# Patient Record
Sex: MALE | Race: WHITE | Employment: OTHER | URBAN - METROPOLITAN AREA
[De-identification: names, ages, dates, MRNs, and addresses within clinical notes are randomized per-mention and may not be internally consistent; named-entity substitution may affect disease eponyms.]

---

## 2022-06-07 ENCOUNTER — APPOINTMENT (OUTPATIENT)
Dept: GENERAL RADIOLOGY | Age: 87
End: 2022-06-07
Payer: MEDICARE

## 2022-06-07 ENCOUNTER — HOSPITAL ENCOUNTER (EMERGENCY)
Age: 87
Discharge: HOME | End: 2022-06-07
Attending: EMERGENCY MEDICINE
Payer: MEDICARE

## 2022-06-07 VITALS
HEART RATE: 54 BPM | RESPIRATION RATE: 18 BRPM | OXYGEN SATURATION: 99 % | DIASTOLIC BLOOD PRESSURE: 61 MMHG | SYSTOLIC BLOOD PRESSURE: 148 MMHG | TEMPERATURE: 98.3 F

## 2022-06-07 DIAGNOSIS — S51.812A LACERATION OF LEFT FOREARM, INITIAL ENCOUNTER: Primary | ICD-10-CM

## 2022-06-07 PROCEDURE — 12002 RPR S/N/AX/GEN/TRNK2.6-7.5CM: CPT

## 2022-06-07 PROCEDURE — 73090 X-RAY EXAM OF FOREARM: CPT

## 2022-06-07 PROCEDURE — 99283 EMERGENCY DEPT VISIT LOW MDM: CPT

## 2022-06-07 PROCEDURE — 2500000003 HC RX 250 WO HCPCS: Performed by: PHYSICIAN ASSISTANT

## 2022-06-07 RX ORDER — LIDOCAINE HYDROCHLORIDE AND EPINEPHRINE 10; 10 MG/ML; UG/ML
20 INJECTION, SOLUTION INFILTRATION; PERINEURAL ONCE
Status: COMPLETED | OUTPATIENT
Start: 2022-06-07 | End: 2022-06-07

## 2022-06-07 RX ORDER — BACITRACIN ZINC AND POLYMYXIN B SULFATE 500; 1000 [USP'U]/G; [USP'U]/G
OINTMENT TOPICAL ONCE
Status: DISCONTINUED | OUTPATIENT
Start: 2022-06-07 | End: 2022-06-07 | Stop reason: HOSPADM

## 2022-06-07 RX ADMIN — LIDOCAINE HYDROCHLORIDE,EPINEPHRINE BITARTRATE 20 ML: 10; .01 INJECTION, SOLUTION INFILTRATION; PERINEURAL at 19:55

## 2022-06-07 ASSESSMENT — PAIN - FUNCTIONAL ASSESSMENT: PAIN_FUNCTIONAL_ASSESSMENT: NONE - DENIES PAIN

## 2022-06-07 ASSESSMENT — ENCOUNTER SYMPTOMS: COLOR CHANGE: 0

## 2022-06-07 NOTE — ED PROVIDER NOTES
[address] ENCOUNTER          PHYSICIAN ASSISTANT NOTE       Date of evaluation: 6/7/2022    Chief Complaint     Laceration (from nursing home; hx dementia; laceration to left forearm after leaving on a glass case and breaking it)      History of Present Illness     Tierra Rutherford is a 80 y.o. male who presents with complaint of laceration to his left forearm. He was leaning on a glass table when it broke and he sustained laceration to his left forearm. He states this is the second time that this has happened to him. He denies any pain. Denies weakness or numbness. He comes from a nursing home and has a baseline of dementia. His tetanus was up-to-date in 2021. Review of Systems     Review of Systems   Musculoskeletal: Negative for arthralgias and myalgias. Skin: Positive for wound. Negative for color change. Neurological: Negative for weakness and numbness. Hematological: Does not bruise/bleed easily. All other systems reviewed and are negative. Past Medical, Surgical, Family, and Social History     He has a past medical history of Cancer ([facility] [address] DU (duodenal ulcer), and Osteoarthritis. He has a past surgical history that includes Hemorrhoid surgery (N/A, 1975); Toe amputation (Right, 1999); Lithotripsy (Right, 1/2013); and Colonoscopy (2004). His family history includes Heart Disease in his father and mother; High Blood Pressure in his father. He reports that he has quit smoking. He has quit using smokeless tobacco. He reports current alcohol use. He reports that he does not use drugs. Medications     Previous Medications    FISH OIL-CHOLECALCIFEROL (FISH OIL + D3 PO)    Take 750 mg by mouth daily. Allergies     He has No Known Allergies. Physical Exam     INITIAL VITALS: BP: (!) 148/61, Temp: 98.3 °F (36.8 °C), Heart Rate: 54, Resp: 18, SpO2: 99 %  Physical Exam  Vitals and nursing note reviewed.    Musculoskeletal:      Left forearm: Laceration (2 skin tear laceration on the forearm with subQ fat exposed) present. No swelling, edema, deformity, tenderness or bony tenderness. Skin:     General: Skin is warm and dry. Neurological:      General: No focal deficit present. Mental Status: He is alert. Psychiatric:         Behavior: Behavior normal.               Diagnostic Results       RADIOLOGY:  XR RADIUS ULNA LEFT (2 VIEWS)   Final Result   Impression:   No acute osseous abnormality. Soft tissue laceration. No radiopaque foreign body. LABS:   No results found for this visit on 06/07/22. ED BEDSIDE ULTRASOUND:      RECENT VITALS:  BP: (!) 148/61, Temp: 98.3 °F (36.8 °C), Heart Rate: 54, Resp: 18, SpO2: 99 %     Procedures     Laceration Repair Procedure Note    Indication: Laceration    Procedure:  After verbal informed consent was obtained, the patient was placed in the appropriate position and anesthesia around the laceration was obtained by infiltration using 1% Lidocaine with epinephrine. The area was then irrigated with high pressure normal saline. The laceration was closed with 5-0 Ethilon using interrupted sutures. A second laceration was closed with 5-0 Ethilon using interrupted sutures. The wound area was then dressed with polysporin and dressing. Total repaired wound length: 4 cm. Other Items: Suture count: 5 and 2    The patient tolerated the procedure well. Complications: None      ED Course     Nursing Notes, Past Medical Hx,Past Surgical Hx, Social Hx, Allergies, and Family Hx were reviewed. The patient was given the following medications:  Orders Placed This Encounter   Medications    lidocaine-EPINEPHrine 1 %-1:[id number] injection 20 mL    bacitracin-polymyxin b (POLYSPORIN) ointment       CONSULTS:  None    MEDICAL DECISION MAKING / ASSESSMENT / Felipedanny Chu is a 80 y.o. male who presented to the emergency department after an injury that he fell through a glass table.   There was no foreign body seen. He is neurovascularly intact. He sustained to lacerations/skin tears on the left volar forearm. His tetanus is up-to-date. An x-ray of the area was obtained and was negative for fracture or radiopaque foreign body. Patient had laceration repair that he tolerated well. Please see procedure note above for details. The larger laceration and the smaller laceration that had exposure of the subcutaneous tissue were both sutured and there was a small area that was superficial and just a skin tear. At this time patient is stable for discharge and was given instruction on laceration care. He lives in a nursing home so they can monitor the wound for signs of infection and determine exactly when is the best time for suture removal as he may have some delayed healing. If he has new or worsening symptoms he should return the emergency room. He is stable for discharge. This patient was also evaluated by the attending physician. All care plans were discussed and agreed upon. Clinical Impression     1.  Laceration of left forearm, initial encounter        Disposition     PATIENT REFERRED [place]  [address] [address]  [phone]            DISCHARGE MEDICATIONS:  New Prescriptions    No medications on file       DISPOSITION Decision To Discharge 06/07/2022 07:34:53 PM        [place], [place]  06/07/22 1952

## 2022-07-22 ENCOUNTER — HOSPITAL ENCOUNTER (OUTPATIENT)
Dept: VASCULAR LAB | Age: 87
Discharge: HOME | End: 2022-07-22
Payer: MEDICARE

## 2022-07-22 DIAGNOSIS — R60.0 LOCALIZED EDEMA: ICD-10-CM

## 2022-07-22 PROCEDURE — 93970 EXTREMITY STUDY: CPT

## 2022-08-20 ENCOUNTER — APPOINTMENT (OUTPATIENT)
Dept: CT IMAGING | Age: 87
End: 2022-08-20
Payer: MEDICARE

## 2022-08-20 ENCOUNTER — HOSPITAL ENCOUNTER (EMERGENCY)
Age: 87
Discharge: HOME | End: 2022-08-20
Attending: EMERGENCY MEDICINE
Payer: MEDICARE

## 2022-08-20 ENCOUNTER — APPOINTMENT (OUTPATIENT)
Dept: GENERAL RADIOLOGY | Age: 87
End: 2022-08-20
Payer: MEDICARE

## 2022-08-20 VITALS
RESPIRATION RATE: 16 BRPM | TEMPERATURE: 97.9 F | OXYGEN SATURATION: 96 % | HEART RATE: 66 BPM | SYSTOLIC BLOOD PRESSURE: 110 MMHG | DIASTOLIC BLOOD PRESSURE: 78 MMHG
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
## 2022-08-20 DIAGNOSIS — S09.90XA INJURY OF HEAD, INITIAL ENCOUNTER: Primary | ICD-10-CM

## 2022-08-20 DIAGNOSIS — S51.011A SKIN TEAR OF RIGHT ELBOW WITHOUT COMPLICATION, INITIAL ENCOUNTER: ICD-10-CM

## 2022-08-20 PROCEDURE — 99284 EMERGENCY DEPT VISIT MOD MDM: CPT

## 2022-08-20 PROCEDURE — 73560 X-RAY EXAM OF KNEE 1 OR 2: CPT

## 2022-08-20 PROCEDURE — 72125 CT NECK SPINE W/O DYE: CPT

## 2022-08-20 PROCEDURE — 73562 X-RAY EXAM OF KNEE 3: CPT

## 2022-08-20 PROCEDURE — 73080 X-RAY EXAM OF ELBOW: CPT

## 2022-08-20 PROCEDURE — 70450 CT HEAD/BRAIN W/O DYE: CPT

## 2022-08-20 RX ORDER — HYDROCHLOROTHIAZIDE 25 MG/1
TABLET ORAL
COMMUNITY
Start: 2022-08-15

## 2022-08-20 RX ORDER — TAMSULOSIN HYDROCHLORIDE 0.4 MG/1
CAPSULE ORAL
COMMUNITY
Start: 2022-06-04

## 2022-08-20 RX ORDER — DONEPEZIL HYDROCHLORIDE 10 MG/1
TABLET, FILM COATED ORAL
COMMUNITY
Start: 2022-07-10

## 2022-08-20 ASSESSMENT — ENCOUNTER SYMPTOMS
SHORTNESS OF BREATH: 0
DIARRHEA: 0
VOMITING: 0
NAUSEA: 0
COUGH: 0
ABDOMINAL PAIN: 0
EYE PAIN: 0
WHEEZING: 0

## 2022-08-20 NOTE — ED PROVIDER NOTES
[address] [facility]          ATTENDING PHYSICIAN NOTE       Date of evaluation: 8/20/2022    Chief Complaint     Fall (Pt family sts that they were called by pts SNF that pt tripped walking back from dinner and fell face first onto carpet. Denies LOC. Small hematoma noted to forehead. Skin tear noted to R elbow and pt c/o bilateral knee pain. Able to ambulate to triage wit family assistance )      History of Present Illness     Melissa Wood is a 80 y.o. male who presents with chief complaint of fall. At this was a witnessed fall by the aide in his nursing facility. He was walking on some carpet when he appeared to trip and fall down striking his bilateral knees and his forehead. No loss of consciousness. Was able to get up and walk around on his own. Complains of pain in his bilateral knees. Has a skin tear to the right forearm just distal to the elbow. Tetanus is up-to-date. No headache. No nausea or vomiting. No neck pain or stiffness. No numbness or weakness. No chest pain. No abdominal pain. No anticoagulant use. Review of Systems     Review of Systems   Constitutional:  Negative for chills and fever. HENT:  Negative for congestion. Eyes:  Negative for pain. Respiratory:  Negative for cough, shortness of breath and wheezing. Cardiovascular:  Negative for chest pain and leg swelling. Gastrointestinal:  Negative for abdominal pain, diarrhea, nausea and vomiting. Genitourinary:  Negative for dysuria. Musculoskeletal:  Positive for arthralgias. Skin:  Positive for wound. Negative for rash. Neurological:  Negative for weakness and headaches. All other systems reviewed and are negative. 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  2nd ETOH     Osteoarthritis          Procedure Laterality Date    COLONOSCOPY 2004    normal     HEMORRHOID SURGERY N/A 1975    LITHOTRIPSY Right 1/2013    UC    TOE AMPUTATION Right 1999    melenoma     His family history includes Heart Disease in his father and mother; High Blood Pressure in his father. He reports that he has quit smoking. He has quit using smokeless tobacco. He reports current alcohol use. He reports that he does not use drugs. Medications     Previous Medications    DONEPEZIL (ARICEPT) 10 MG TABLET        FISH OIL-CHOLECALCIFEROL (FISH OIL + D3 PO)    Take 750 mg by mouth daily. HYDROCHLOROTHIAZIDE (HYDRODIURIL) 25 MG TABLET        TAMSULOSIN (FLOMAX) 0.4 MG CAPSULE           Allergies     He has No Known Allergies. Physical Exam     ED Triage Vitals [08/20/22 1930]   Enc Vitals Group      /78      Heart Rate 66      Resp 16      Temp 97.9 °F (36.6 °C)      Temp Source Oral      SpO2 96 %      Weight       Height       Head Circumference       Peak Flow       Pain Score       Pain Loc       Pain Edu? Excl. in [address]? General:  Non-toxic, no acute distress, fully cooperative with my exam    HEENT:  NCAT, there is a small right frontal cephalhematoma, PERRL    Neck:  Supple, no midline cervical spinal tenderness, normal ROM    Pulmonary:   No increased work of breathing; CTAB    Cardiac: Regular rate and rhythm, no murmur, thrill or gallop. Capillary refill <3s.  2+ distal pulses    Abdomen:  Soft, nontender, nondistended; no focal rebound or guarding    Musculoskeletal: There is very slight tenderness palpation about deformity of the patella bilaterally with normal range of motion of the bilateral knees and no laxity, otherwise no focal bony tenderness or abnormalities on exam    Neuro: Awake alert and oriented x4    Skin: There is an approximately 8 cm very superficial skin tear over the right proximal forearm just below the elbow which is not well approximated with minimal tension but is hemostatic      Diagnostic Results         RADIOLOGY:  CT CSpine W/O Contrast   Final Result      Multilevel spondylosis and facet arthropathy with no acute fracture. CT HEAD WO CONTRAST   Final Result      No acute intracranial hemorrhage or mass effect. Mild atrophy and chronic small vessel ischemic change. Questionable minimal right frontal scalp swelling without underlying calvarial fracture. XR ELBOW RIGHT (MIN 3 VIEWS)   Final Result      Right elbow:   Questionable joint effusion without visualized underlying acute fracture. Consider follow-up radiographs in 5 days. Right knee: Moderate osteoarthrosis without acute osseous abnormality. Left knee: Moderate osteoarthrosis without acute osseous abnormality. Anterior soft tissue swelling along the patellar tendon. XR KNEE LEFT (1-2 VIEWS)   Final Result      Right elbow:   Questionable joint effusion without visualized underlying acute fracture. Consider follow-up radiographs in 5 days. Right knee: Moderate osteoarthrosis without acute osseous abnormality. Left knee: Moderate osteoarthrosis without acute osseous abnormality. Anterior soft tissue swelling along the patellar tendon. XR KNEE RIGHT (1-2 VIEWS)   Final Result      Right elbow:   Questionable joint effusion without visualized underlying acute fracture. Consider follow-up radiographs in 5 days. Right knee: Moderate osteoarthrosis without acute osseous abnormality. Left knee: Moderate osteoarthrosis without acute osseous abnormality. Anterior soft tissue swelling along the patellar tendon. LABS:   No results found for this visit on 08/20/22. RECENT VITALS:  BP: 110/78, Temp: 97.9 °F (36.6 °C), Heart Rate: 66, Resp: 16, SpO2: 96 %     Procedures         ED Course     Nursing Notes, Past Medical Hx, Past Surgical Hx, Social Hx, Allergies, and Family Hx were reviewed.     The patient was given the following medications:  No orders of the defined types were placed in this encounter. CONSULTS:  None    MEDICAL DECISION MAKING / ASSESSMENT / PLAN     Deidra Oreilly is a 80 y.o. male who presents after mechanical trip and fall. Does have a skin tear of the right forearm. Does appear to have a small cephalhematoma so struck his head. No loss of consciousness. GCS 15. Noncontrasted head CT without evidence of intracranial traumatic injury. Cervical spine degenerative changes B is no neck pain and no focal deficits and therefore very low suspicion for cervical spinal injury. Plain films of the bilateral knees showed some soft tissue swelling anterior portion of the left knee but he is able to bear weight and has an intact extensor mechanism. Seems more likely to be contusion at this point. The skin tear did not approximate very well with Steri-Strips and I do not think would be amenable to repair with sutures and therefore a Adaptic dressing was placed over the wound and will be allowed to heal by secondary intention. Family instructed on wound care. They believe his tetanus is up-to-date. He is at his mental status baseline. Return precautions were discussed and patient discharged home in good condition. Clinical Impression     1. Injury of head, initial encounter    2. Fall, initial encounter    3.  Skin tear of right elbow without complication, initial encounter        Disposition     PATIENT REFERRED TO:  Adwoa Rodriguez III, MD  [address] [place] [address]          DISCHARGE MEDICATIONS:  New Prescriptions    No medications on file       DISPOSITION Decision To Discharge 08/20/2022 09:43:25 PM         Jose Murguia MD  08/21/22 0984

## 2022-08-21 NOTE — ED NOTES
Patient prepared for and ready to be discharged. Patient discharged at this time in no acute distress after verbalizing understanding of discharge instructions. Patient left after receiving After Visit Summary instructions.       Jonathan Canas RN  08/20/22 [id number]

## 2022-08-21 NOTE — DISCHARGE INSTRUCTIONS
We have placed a dressing over your right elbow skin tear and you should have this changed daily for at least the next couple of weeks. Follow-up with your primary care provider this week. Consider having him use a walker to help him with ambulation and prevent falls. Return to the emergency department for new or worsening symptoms including headaches, confusion, vomiting, difficulty ambulating, or any other changes.

## 2022-10-28 ENCOUNTER — APPOINTMENT (OUTPATIENT)
Dept: GENERAL RADIOLOGY | Age: 87
End: 2022-10-28
Payer: MEDICARE

## 2022-10-28 ENCOUNTER — APPOINTMENT (OUTPATIENT)
Dept: CT IMAGING | Age: 87
End: 2022-10-28
Payer: MEDICARE

## 2022-10-28 ENCOUNTER — HOSPITAL ENCOUNTER (EMERGENCY)
Age: 87
Discharge: HOME | End: 2022-10-28
Attending: EMERGENCY MEDICINE
Payer: MEDICARE

## 2022-10-28 VITALS
TEMPERATURE: 97.8 F | RESPIRATION RATE: 15 BRPM | DIASTOLIC BLOOD PRESSURE: 78 MMHG | HEART RATE: 80 BPM | OXYGEN SATURATION: 98 % | SYSTOLIC BLOOD PRESSURE: 141 MMHG

## 2022-10-28 DIAGNOSIS — S01.81XA FACIAL LACERATION, INITIAL ENCOUNTER: ICD-10-CM

## 2022-10-28 DIAGNOSIS — R29.6 UNWITNESSED FALL: Primary | ICD-10-CM

## 2022-10-28 PROCEDURE — 72125 CT NECK SPINE W/O DYE: CPT

## 2022-10-28 PROCEDURE — 12011 RPR F/E/E/N/L/M 2.5 CM/<: CPT

## 2022-10-28 PROCEDURE — 71045 X-RAY EXAM CHEST 1 VIEW: CPT

## 2022-10-28 PROCEDURE — 99284 EMERGENCY DEPT VISIT MOD MDM: CPT

## 2022-10-28 PROCEDURE — 70450 CT HEAD/BRAIN W/O DYE: CPT

## 2022-10-28 RX ORDER — HALOPERIDOL 5 MG/ML
2.5 INJECTION INTRAMUSCULAR
Status: DISCONTINUED | OUTPATIENT
Start: 2022-10-28 | End: 2022-10-28 | Stop reason: HOSPADM

## 2022-10-28 ASSESSMENT — PAIN - FUNCTIONAL ASSESSMENT
PAIN_FUNCTIONAL_ASSESSMENT: NONE - DENIES PAIN
PAIN_FUNCTIONAL_ASSESSMENT: NONE - DENIES PAIN

## 2022-10-28 NOTE — ED PROVIDER NOTES
[address] [facility] [address] ENCOUNTER          ATTENDING PHYSICIAN NOTE       Date of evaluation: 10/28/2022    ADDENDUM:      Care of this patient was assumed from Dr. Janki Abrams. The patient was seen for Fall and Laceration  . The patient's initial evaluation and plan have been discussed with the prior provider who initially evaluated the patient. Nursing Notes, Past Medical Hx, Past Surgical Hx, Social Hx, Allergies, and Family Hx were all reviewed. Diagnostic Results     EKG   N/a    RADIOLOGY:  CT CSpine W/O Contrast   Final Result      1. No acute fracture cervical spine. 2.  Multilevel cervical spondylosis without interval change including grade 1 anterolisthesis of C2 on C3 and C3 on C4. CT Head W/O Contrast   Final Result      1. No evidence of acute intracranial hemorrhage or mass effect. 2.  Mild chronic small vessel ischemic disease and atrophy. 3.  Indeterminate metallic density in the right external auditory canal.   4.  Right mastoid effusion. 5.  Left frontal scalp contusion. XR CHEST PORTABLE   Final Result      1. Mild nonspecific bilateral perihilar opacities may reflect edema, atelectasis, or infection. LABS:   No results found for this visit on 10/28/22. RECENT VITALS:  BP: (!) 146/72, Temp: 97.8 °F (36.6 °C), Heart Rate: 84, Resp: 20, SpO2: 100 %     Procedures     Lac Repair    Date/Time: 10/28/2022 7:30 AM  Performed by: Aydee Tatum MD  Authorized by:  Mich Martinez MD     Consent:     Consent obtained:  Verbal    Alternatives discussed:  No treatment  Universal protocol:     Patient identity confirmed:  Verbally with patient  Anesthesia:     Anesthesia method:  None  Laceration details:     Location:  Face    Face location:  Forehead    Length (cm):  1  Treatment:     Area cleansed with:  Soap and water  Skin repair:     Repair method:  Tissue adhesive  Repair type:     Repair type:  Simple  Post-procedure details: Dressing:  Open (no dressing)    Procedure completion:  Tolerated      ED Course          The patient was given the following medications:  Orders Placed This Encounter   Medications    haloperidol lactate (HALDOL) injection 2.5 mg       CONSULTS:  None    MEDICAL DECISION MAKING / ASSESSMENT / PLAN     Sergo Mitchell is a 80 y.o. male with laceration to forehead above left eyebrow. CT head and C-spine were negative for traumatic injury. Chest x-ray unremarkable. Laceration pretty superficial, applied some Dermabond. Wound care instructions provided. Return precautions given. Clinical Impression     1. Unwitnessed fall    2. Facial laceration, initial encounter        Disposition     PATIENT REFERRED TO:  No follow-up provider specified.     DISCHARGE MEDICATIONS:  New Prescriptions    No medications on file       P.O. Keesha So MD  10/28/22 9677

## 2022-10-28 NOTE — ED PROVIDER NOTES
[address] ENCOUNTER          ATTENDING PHYSICIAN NOTE       Date of evaluation: 10/28/2022    Chief Complaint     Fall and Laceration      History of Present Illness     Khadra Mtz is a 80 y.o. male with history of dementia presenting to the emergency department today for an unwitnessed fall and facial wounds. Patient resides in a memory care facility ([facility]) and was found in his bathroom this morning trying to wash blood off of his face. He is unable to provide any history and is overall very upset that he is in the hospital.  He perseverates on things being stolen from his room. Additional history is gathered from EMS as well as a phone call with the nurse at his facility. They state he has been very upset lately about people entering his room but has otherwise been in his usual state of health. No one witnessed the presumed fall. He is not on blood thinners per their medication list.    Review of Systems     Unable to obtain given dementia and uncooperative patient    Physical Exam     INITIAL VITALS: BP: (!) 146/72, Temp: 97.8 °F (36.6 °C), Heart Rate: 84, Resp: 20, SpO2: 100 %     Nursing note and vitals reviewed. General:  Adult male, alert and appropriately interactive. Confused and mildly agitated, nonviolent. In no distress. HENT: Normocephalic. Small skin tears/superficial lacerations to the forehead, hemostatic. External ears normal. Nose appears normal externally. Eyes: Conjunctivae normal. No scleral icterus. PERRL. Neck: Neck supple. No tracheal deviation present. CV: Normal rate. Regular rhythm. S1/S2 auscultated. No murmurs, gallops or rubs. 3+ bilateral lower extremity pitting edema. Pulm: Effort normal. Breath sounds clear to auscultation bilaterally. No wheezes. No rales or rhonchi. GI: Soft. No distension. No tenderness. No rebound or guarding. No masses. No peritoneal signs.     Musculoskeletal: No discernible tenderness of the C, T, or L-spine. Pelvis is stable and without tenderness with logroll of the bilateral legs. No discernible bony tenderness of the extremities. Scattered ecchymoses, no wounds. Neurological: Alert, interactive with examiner, not cooperative, oriented x0. Face symmetric, speech without dysarthria or obvious aphasia. Moving all extremities spontaneously. Skin: Warm, dry. No rash. No diaphoresis or erythema. Procedures   Procedures    MEDICAL DECISION MAKING     MDM: Clare Patricia is a 80 y.o. male with history as above presenting to the emergency department today for a fall from his [facility] care facility. On arrival, he is mildly agitated but nonviolent. He has abrasions and superficial lacerations to his face without signs of maxillofacial or skull fracture. No other significant evidence of trauma on his exam.  He underwent CT of the head, C-spine, as well as a chest x-ray. His tetanus immunization is up-to-date per the chart from his previous visit here in August, also for a fall. Family has arrived and confirmed that is immunization up-to-date and patient appears to be interacting and communicating at his baseline. Imaging and wound care pending at time of signout. Patient signed out the oncoming physician, Dr. Georgi Boast, who will follow-up on the results and determine the patient's ultimate disposition. Clinical Impression     1. Unwitnessed fall    2. Facial laceration, initial encounter        Disposition     DISPOSITION          Joana Ludwig MD  6:51 AM                     Past Medical, Surgical, Family, and Social History     He has a past medical history of Cancer ([facility] [address] DU (duodenal ulcer), and Osteoarthritis. He has a past surgical history that includes Hemorrhoid surgery (N/A, 1975); Toe amputation (Right, 1999); Lithotripsy (Right, 1/2013); and Colonoscopy (2004).   His family history includes Heart Disease in his father and mother; High Blood Pressure in his father. He reports that he has quit smoking. He has quit using smokeless tobacco. He reports current alcohol use. He reports that he does not use drugs. Medications     Previous Medications    DONEPEZIL (ARICEPT) 10 MG TABLET        FISH OIL-CHOLECALCIFEROL (FISH OIL + D3 PO)    Take 750 mg by mouth daily. HYDROCHLOROTHIAZIDE (HYDRODIURIL) 25 MG TABLET        TAMSULOSIN (FLOMAX) 0.4 MG CAPSULE           Allergies     He has No Known Allergies. ED Course     Nursing Notes, Past Medical Hx, Past Surgical Hx, Social Hx,Allergies, and Family Hx were reviewed. Patient was given the following medications:  Orders Placed This Encounter   Medications    haloperidol lactate (HALDOL) injection 2.5 mg       Diagnostic Results         RECENT VITALS:  BP: (!) 146/72,Temp: 97.8 °F (36.6 °C), Heart Rate: 84, Resp: 20, SpO2: 100 %     RADIOLOGY:  CT CSpine W/O Contrast    (Results Pending)   CT Head W/O Contrast    (Results Pending)   XR CHEST PORTABLE    (Results Pending)       LABS:   No results found for this visit on 10/28/22. CONSULTS:  None    PATIENT REFERRED TO:  No follow-up provider specified.     DISCHARGE MEDICATIONS:  New Prescriptions    No medications on file           Mich Martinez MD  10/28/22 0474

## 2022-10-28 NOTE — DISCHARGE INSTRUCTIONS
If wound does not remained closed with Dermabond, it will not need any further repair, just continue to wash with warm soapy water and allow wound to heal on its own.

## 2022-10-28 NOTE — ED NOTES
Unable to obtain a weight, patient is a fall risk and unable to stand. Patient also has severe dementia and no bed-scale present on bed.       Evens Rodriguez RN  10/28/22 9462